# Patient Record
Sex: FEMALE | Race: WHITE | URBAN - METROPOLITAN AREA
[De-identification: names, ages, dates, MRNs, and addresses within clinical notes are randomized per-mention and may not be internally consistent; named-entity substitution may affect disease eponyms.]

---

## 2019-11-19 ENCOUNTER — OTHER (OUTPATIENT)
Dept: URBAN - METROPOLITAN AREA CLINIC 18 | Facility: CLINIC | Age: 34
Setting detail: DERMATOLOGY
End: 2019-11-19

## 2019-11-19 DIAGNOSIS — L57.0 ACTINIC KERATOSIS: ICD-10-CM

## 2019-11-19 PROBLEM — D18.01 HEMANGIOMA OF SKIN AND SUBCUTANEOUS TISSUE: Status: RESOLVED | Noted: 2019-11-19

## 2019-11-19 PROBLEM — L82.1 OTHER SEBORRHEIC KERATOSIS: Status: RESOLVED | Noted: 2019-11-19

## 2019-11-19 PROCEDURE — 99203 OFFICE O/P NEW LOW 30 MIN: CPT

## 2019-12-06 ENCOUNTER — OTHER (OUTPATIENT)
Dept: URBAN - METROPOLITAN AREA CLINIC 18 | Facility: CLINIC | Age: 34
Setting detail: DERMATOLOGY
End: 2019-12-06

## 2019-12-06 DIAGNOSIS — D48.5 NEOPLASM OF UNCERTAIN BEHAVIOR OF SKIN: ICD-10-CM

## 2019-12-06 PROCEDURE — 11102 TANGNTL BX SKIN SINGLE LES: CPT

## 2020-02-11 ENCOUNTER — COMPLETE SKIN EXAM (OUTPATIENT)
Dept: URBAN - METROPOLITAN AREA CLINIC 18 | Facility: CLINIC | Age: 35
Setting detail: DERMATOLOGY
End: 2020-02-11

## 2020-02-11 DIAGNOSIS — D48.5 NEOPLASM OF UNCERTAIN BEHAVIOR OF SKIN: ICD-10-CM

## 2020-02-11 PROCEDURE — 99214 OFFICE O/P EST MOD 30 MIN: CPT

## 2020-02-11 PROCEDURE — 11102 TANGNTL BX SKIN SINGLE LES: CPT

## 2021-05-20 ENCOUNTER — OFFICE (OUTPATIENT)
Dept: URBAN - METROPOLITAN AREA CLINIC 72 | Facility: CLINIC | Age: 36
End: 2021-05-20

## 2021-05-20 VITALS
SYSTOLIC BLOOD PRESSURE: 120 MMHG | DIASTOLIC BLOOD PRESSURE: 78 MMHG | HEART RATE: 81 BPM | HEIGHT: 63 IN | WEIGHT: 103 LBS

## 2021-05-20 DIAGNOSIS — R19.7 DIARRHEA, UNSPECIFIED: ICD-10-CM

## 2021-05-20 DIAGNOSIS — R10.13 EPIGASTRIC PAIN: ICD-10-CM

## 2021-05-20 DIAGNOSIS — R52 PAIN, UNSPECIFIED: ICD-10-CM

## 2021-05-20 DIAGNOSIS — R53.83 OTHER FATIGUE: ICD-10-CM

## 2021-05-20 DIAGNOSIS — R63.4 ABNORMAL WEIGHT LOSS: ICD-10-CM

## 2021-05-20 DIAGNOSIS — R41.0 DISORIENTATION, UNSPECIFIED: ICD-10-CM

## 2021-05-20 DIAGNOSIS — R59.1 GENERALIZED ENLARGED LYMPH NODES: ICD-10-CM

## 2021-05-20 PROCEDURE — 99204 OFFICE O/P NEW MOD 45 MIN: CPT | Performed by: INTERNAL MEDICINE

## 2021-05-20 RX ORDER — FAMOTIDINE 20 MG/1
TABLET, FILM COATED ORAL
Qty: 60 | Refills: 2 | Status: ACTIVE
Start: 2021-05-20

## 2021-05-20 NOTE — SERVICEHPINOTES
Thomas Miranda   is seen for an initial visit today.     Daniella has a history of melanoma which was felt to be localized to her finger. She had PET scan and sentinel node biopsy all was negative  THe last PET was a year ago. 3 years ago she had some black spots in her eyes and some anxious symptoms.  She went to neurology.  Those never went away.  She went back to the neurologist again after the melanoma and there was nothing new concerning. Current symtpoms started March of 2020.  Symptoms are swollen lymph nodes mainly on left neck, fatigue, brain fog, body aches, poor appetite, weight loss.  There is a knot in the upper stomach, dyspepsia feeling, occasional diarrhea.  The diarrhea is about once a week.  That is only in the last month or so.  Poor appetite is also last month.  No blood in the stool, no black stool.  She feels worse joint/muscle wise at the end of the day.  Her mood is not depressed.  Some skin sensitivity/folliculitis that comes and goes.  She has some night sweats.  She also has it throughout the day.  She is still breastfeeding a little (mostly stopped).   [facility]ovanna nurse has reviewed and updated the medication list with the patient. I have reviewed the medication list along with the documented medical, social and family history. Pertinent details are also noted above in the HPI.

## 2021-05-20 NOTE — SERVICENOTES
Our goal is to partner with you to improve your health and well being. It is important for you to complete necessary testing and follow the instructions given to you at your clinic visit. Our office will call you within 2 weeks with results of any testing but you may also call sooner to obtain results - [phone].   If you have any questions or concerns please feel free to call us.  We take your care very seriously and we thank you for your trust!
- labs
- stool studies
- CXR
- schedule EGD
- schedule follow up will be based on above
- famotidine 20 mg twice a day as needed for dyspepsia

## 2021-05-28 LAB
ALLERGEN PROFILE, FOOD-MEAT: CLASS DESCRIPTION: (no result)
ALLERGEN PROFILE, FOOD-MEAT: F026-IGE PORK: <0.1 KU/L
ALLERGEN PROFILE, FOOD-MEAT: F027-IGE BEEF: <0.1 KU/L
ALLERGEN PROFILE, FOOD-MEAT: F083-IGE CHICKEN: <0.1 KU/L
ANTINUCLEAR ANTIBODIES, IFA: NEGATIVE
C-REACTIVE PROTEIN, QUANT: <1 MG/L
CBC WITH DIFFERENTIAL/PLATELET: BASO (ABSOLUTE): 0.1 X10E3/UL (ref 0–0.2)
CBC WITH DIFFERENTIAL/PLATELET: BASOS: 1 %
CBC WITH DIFFERENTIAL/PLATELET: EOS (ABSOLUTE): 0.2 X10E3/UL (ref 0–0.4)
CBC WITH DIFFERENTIAL/PLATELET: EOS: 3 %
CBC WITH DIFFERENTIAL/PLATELET: HEMATOCRIT: 41.9 % (ref 34–46.6)
CBC WITH DIFFERENTIAL/PLATELET: HEMATOLOGY COMMENTS: (no result)
CBC WITH DIFFERENTIAL/PLATELET: HEMOGLOBIN: 13.9 G/DL (ref 11.1–15.9)
CBC WITH DIFFERENTIAL/PLATELET: IMMATURE CELLS: (no result)
CBC WITH DIFFERENTIAL/PLATELET: IMMATURE GRANS (ABS): 0 X10E3/UL (ref 0–0.1)
CBC WITH DIFFERENTIAL/PLATELET: IMMATURE GRANULOCYTES: 0 %
CBC WITH DIFFERENTIAL/PLATELET: LYMPHS (ABSOLUTE): 1.5 X10E3/UL (ref 0.7–3.1)
CBC WITH DIFFERENTIAL/PLATELET: LYMPHS: 22 %
CBC WITH DIFFERENTIAL/PLATELET: MCH: 31 PG (ref 26.6–33)
CBC WITH DIFFERENTIAL/PLATELET: MCHC: 33.2 G/DL (ref 31.5–35.7)
CBC WITH DIFFERENTIAL/PLATELET: MCV: 93 FL (ref 79–97)
CBC WITH DIFFERENTIAL/PLATELET: MONOCYTES(ABSOLUTE): 0.4 X10E3/UL (ref 0.1–0.9)
CBC WITH DIFFERENTIAL/PLATELET: MONOCYTES: 6 %
CBC WITH DIFFERENTIAL/PLATELET: NEUTROPHILS (ABSOLUTE): 4.5 X10E3/UL (ref 1.4–7)
CBC WITH DIFFERENTIAL/PLATELET: NEUTROPHILS: 68 %
CBC WITH DIFFERENTIAL/PLATELET: NRBC: (no result)
CBC WITH DIFFERENTIAL/PLATELET: PLATELETS: 296 X10E3/UL (ref 150–450)
CBC WITH DIFFERENTIAL/PLATELET: RBC: 4.49 X10E6/UL (ref 3.77–5.28)
CBC WITH DIFFERENTIAL/PLATELET: RDW: 12.2 % (ref 11.7–15.4)
CBC WITH DIFFERENTIAL/PLATELET: WBC: 6.7 X10E3/UL (ref 3.4–10.8)
CHROMOGRANIN A: 54.5 NG/ML (ref 0–101.8)
COMP. METABOLIC PANEL (14): A/G RATIO: 1.6 (ref 1.2–2.2)
COMP. METABOLIC PANEL (14): ALBUMIN: 4.8 G/DL (ref 3.8–4.8)
COMP. METABOLIC PANEL (14): ALKALINE PHOSPHATASE: 39 IU/L — LOW (ref 48–121)
COMP. METABOLIC PANEL (14): ALT (SGPT): 9 IU/L (ref 0–32)
COMP. METABOLIC PANEL (14): AST (SGOT): 16 IU/L (ref 0–40)
COMP. METABOLIC PANEL (14): BILIRUBIN, TOTAL: 0.6 MG/DL (ref 0–1.2)
COMP. METABOLIC PANEL (14): BUN/CREATININE RATIO: 14 (ref 9–23)
COMP. METABOLIC PANEL (14): BUN: 12 MG/DL (ref 6–20)
COMP. METABOLIC PANEL (14): CALCIUM: 9.7 MG/DL (ref 8.7–10.2)
COMP. METABOLIC PANEL (14): CARBON DIOXIDE, TOTAL: 24 MMOL/L (ref 20–29)
COMP. METABOLIC PANEL (14): CHLORIDE: 103 MMOL/L (ref 96–106)
COMP. METABOLIC PANEL (14): CREATININE: 0.83 MG/DL (ref 0.57–1)
COMP. METABOLIC PANEL (14): EGFR IF AFRICN AM: 107 ML/MIN/1.73 (ref 59–?)
COMP. METABOLIC PANEL (14): EGFR IF NONAFRICN AM: 93 ML/MIN/1.73 (ref 59–?)
COMP. METABOLIC PANEL (14): GLOBULIN, TOTAL: 3 G/DL (ref 1.5–4.5)
COMP. METABOLIC PANEL (14): GLUCOSE: 92 MG/DL (ref 65–99)
COMP. METABOLIC PANEL (14): POTASSIUM: 4.3 MMOL/L (ref 3.5–5.2)
COMP. METABOLIC PANEL (14): PROTEIN, TOTAL: 7.8 G/DL (ref 6–8.5)
COMP. METABOLIC PANEL (14): SODIUM: 140 MMOL/L (ref 134–144)
FERRITIN, SERUM: 43 NG/ML (ref 15–150)
H PYLORI, IGM, IGG, IGA AB: H PYLORI, IGM ABS: <9 UNITS
H PYLORI, IGM, IGG, IGA AB: H. PYLORI, IGA ABS: <9 UNITS
H PYLORI, IGM, IGG, IGA AB: H. PYLORI, IGG ABS: 0.18 INDEX VALUE (ref 0–0.79)
IMMUNOGLOBULIN A, QN, SERUM: 203 MG/DL (ref 87–352)
IRON AND TIBC: IRON BIND.CAP.(TIBC): 362 UG/DL (ref 250–450)
IRON AND TIBC: IRON SATURATION: 23 % (ref 15–55)
IRON AND TIBC: IRON: 83 UG/DL (ref 27–159)
IRON AND TIBC: UIBC: 279 UG/DL (ref 131–425)
Lab: 21.7 U/ML — HIGH (ref 0–17.9)
Lab: 9.8 U/ML — HIGH (ref 0–8.9)
Lab: <0.8 INDEX
Lab: <0.91 ISR
Lab: <36 U/ML
Lab: NEGATIVE
ROCKY MTN SPOTTED FEVER, IGM: 0.2 INDEX (ref 0–0.89)
SEDIMENTATION RATE-WESTERGREN: 5 MM/HR (ref 0–32)
T-TRANSGLUTAMINASE (TTG) IGA: <2 U/ML
THYROID CASCADE PROFILE: TSH: 1.98 UIU/ML (ref 0.45–4.5)
VITAMIN B12: 561 PG/ML (ref 232–1245)
VITAMIN D, 25-HYDROXY: 35.9 NG/ML (ref 30–100)

## 2021-09-10 ENCOUNTER — OFFICE (OUTPATIENT)
Dept: URBAN - METROPOLITAN AREA PATHOLOGY 24 | Facility: PATHOLOGY | Age: 36
End: 2021-09-10
Payer: OTHER GOVERNMENT

## 2021-09-10 ENCOUNTER — AMBULATORY SURGICAL CENTER (OUTPATIENT)
Dept: URBAN - METROPOLITAN AREA SURGERY 19 | Facility: SURGERY | Age: 36
End: 2021-09-10
Payer: OTHER GOVERNMENT

## 2021-09-10 DIAGNOSIS — R14.0 ABDOMINAL DISTENSION (GASEOUS): ICD-10-CM

## 2021-09-10 DIAGNOSIS — R10.13 EPIGASTRIC PAIN: ICD-10-CM

## 2021-09-10 DIAGNOSIS — R11.0 NAUSEA: ICD-10-CM

## 2021-09-10 LAB
RELEVANT H&P ENDOSCOPY: (no result)
RELEVANT H&P ENDOSCOPY: (no result)

## 2021-09-10 PROCEDURE — 88305 TISSUE EXAM BY PATHOLOGIST: CPT | Performed by: INTERNAL MEDICINE

## 2021-09-10 PROCEDURE — 43239 EGD BIOPSY SINGLE/MULTIPLE: CPT | Performed by: INTERNAL MEDICINE

## 2021-11-11 ENCOUNTER — OFFICE (OUTPATIENT)
Dept: URBAN - METROPOLITAN AREA PATHOLOGY 24 | Facility: PATHOLOGY | Age: 36
End: 2021-11-11
Payer: OTHER GOVERNMENT

## 2021-11-11 ENCOUNTER — AMBULATORY SURGICAL CENTER (OUTPATIENT)
Dept: URBAN - METROPOLITAN AREA SURGERY 19 | Facility: SURGERY | Age: 36
End: 2021-11-11
Payer: OTHER GOVERNMENT

## 2021-11-11 DIAGNOSIS — R19.7 DIARRHEA, UNSPECIFIED: ICD-10-CM

## 2021-11-11 PROCEDURE — 88305 TISSUE EXAM BY PATHOLOGIST: CPT | Performed by: INTERNAL MEDICINE

## 2021-11-11 PROCEDURE — 88342 IMHCHEM/IMCYTCHM 1ST ANTB: CPT | Performed by: INTERNAL MEDICINE

## 2021-11-11 PROCEDURE — 45380 COLONOSCOPY AND BIOPSY: CPT | Performed by: INTERNAL MEDICINE

## 2025-06-19 ENCOUNTER — TELEHEALTH PROVIDED OTHER THAN IN PATIENT'S HOME (OUTPATIENT)
Dept: URBAN - METROPOLITAN AREA CLINIC 72 | Facility: CLINIC | Age: 40
End: 2025-06-19
Payer: OTHER GOVERNMENT

## 2025-06-19 VITALS — HEIGHT: 63 IN | WEIGHT: 112 LBS

## 2025-06-19 DIAGNOSIS — R14.0 ABDOMINAL DISTENSION (GASEOUS): ICD-10-CM

## 2025-06-19 DIAGNOSIS — M25.551 PAIN IN RIGHT HIP: ICD-10-CM

## 2025-06-19 DIAGNOSIS — G90.9 DISORDER OF THE AUTONOMIC NERVOUS SYSTEM, UNSPECIFIED: ICD-10-CM

## 2025-06-19 DIAGNOSIS — R53.83 OTHER FATIGUE: ICD-10-CM

## 2025-06-19 DIAGNOSIS — N32.89 OTHER SPECIFIED DISORDERS OF BLADDER: ICD-10-CM

## 2025-06-19 DIAGNOSIS — R10.31 RIGHT LOWER QUADRANT PAIN: ICD-10-CM

## 2025-06-19 DIAGNOSIS — R10.2 PELVIC AND PERINEAL PAIN: ICD-10-CM

## 2025-06-19 DIAGNOSIS — R19.4 CHANGE IN BOWEL HABIT: ICD-10-CM

## 2025-06-19 DIAGNOSIS — K58.0 IRRITABLE BOWEL SYNDROME WITH DIARRHEA: ICD-10-CM

## 2025-06-19 PROCEDURE — 99203 OFFICE O/P NEW LOW 30 MIN: CPT | Mod: 95 | Performed by: INTERNAL MEDICINE

## 2025-06-19 RX ORDER — RIFAXIMIN 550 MG/1
1650 TABLET ORAL
Qty: 42 | Refills: 0 | Status: ACTIVE
Start: 2025-06-19

## 2025-06-19 NOTE — SERVICEHPINOTES
br   Initial visit 5/2021
br
Vlima has a history of melanoma which was felt to be localized to her finger. She had PET scan and sentinel node biopsy all was negative THe last PET was a year ago.3 years ago she had some black spots in her eyes and some anxious symptoms. She went to neurology. Those never went away. She went back to the neurologist again after the melanoma and there was nothing new concerning.Current symtpoms started March of 2020. Symptoms are swollen lymph nodes mainly on left neck, fatigue, brain fog, body aches, poor appetite, weight loss. There is a knot in the upper stomach, dyspepsia feeling, occasional diarrhea. The diarrhea is about once a week. That is only in the last month or so. Poor appetite is also last month. No blood in the stool, no black stool. She feels worse joint/muscle wise at the end of the day. Her mood is not depressed. Some skin sensitivity/folliculitis that comes and goes. She has some night sweats. She also has it throughout the day. She is still breastfeeding a little (mostly stopped). 
br
br plan from last visit
br I will get labs, stool and CXR (for sarcoid). If stool positive may need colonoscopy. I will schedule EGD with biopsy for atrophic gastritis, celiac and disachhridase. 
br
br Interval history 6/2025
br 
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  I treated her with doxycyline
br
br She now is having RLQ pain. 
br
Cramping, contraction pain in the lower abdomen for the last couple months.  
br
br This past THursday there is a pain in the right hip. WHen she coughs or laughs it is more aching.  It is there all all the time. 
br
br
She also notes a heavy, gassy, bloated feeling in ther lower abdomen.  BM are soft and mushy once a day.  She feels like she needs to go way more.  
br
 No fevers or night sweats.  Her weight is consistent. 
br
No rashes or joint pain.  
br
br She has symptoms of autonomic dysfunction and POTS disease.  She constantly feels bloated and gassy.  
br
br Periods are normal.  
br She did see GYN in the last year becasue of bladder issues and pelvic pain. 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  br br
br

## 2025-06-19 NOTE — SERVICENOTES
Telehealth Platform Used: [employer]
Location of patient: Patient will be in her car
Location of provider: [facility]
Other persons participating:  Patients , Gabriel